# Patient Record
Sex: FEMALE | Race: BLACK OR AFRICAN AMERICAN | Employment: FULL TIME | URBAN - METROPOLITAN AREA
[De-identification: names, ages, dates, MRNs, and addresses within clinical notes are randomized per-mention and may not be internally consistent; named-entity substitution may affect disease eponyms.]

---

## 2020-07-15 PROBLEM — O16.9 ELEVATED BLOOD PRESSURE AFFECTING PREGNANCY, ANTEPARTUM: Status: ACTIVE | Noted: 2020-07-15

## 2020-07-15 PROBLEM — O09.512 PRIMIGRAVIDA OF ADVANCED MATERNAL AGE IN SECOND TRIMESTER: Status: ACTIVE | Noted: 2020-07-15

## 2020-08-11 PROBLEM — A74.9 CHLAMYDIA INFECTION COMPLICATING PREGNANCY, SECOND TRIMESTER: Status: ACTIVE | Noted: 2020-08-11

## 2020-08-11 PROBLEM — O98.812 CHLAMYDIA INFECTION COMPLICATING PREGNANCY, SECOND TRIMESTER: Status: ACTIVE | Noted: 2020-08-11

## 2020-08-13 PROBLEM — O99.012 ANEMIA AFFECTING PREGNANCY IN SECOND TRIMESTER: Status: ACTIVE | Noted: 2020-08-13

## 2020-09-25 PROBLEM — O13.2 GESTATIONAL HYPERTENSION, SECOND TRIMESTER: Status: ACTIVE | Noted: 2020-07-15

## 2020-09-28 PROBLEM — O10.012 CHRONIC BENIGN ESSENTIAL HYPERTENSION IN SECOND TRIMESTER: Status: ACTIVE | Noted: 2020-07-15

## 2020-09-28 PROBLEM — O99.212 OBESITY AFFECTING PREGNANCY IN SECOND TRIMESTER: Status: ACTIVE | Noted: 2020-09-28

## 2020-10-01 PROBLEM — O99.810 ABNORMAL GLUCOSE AFFECTING PREGNANCY: Status: ACTIVE | Noted: 2020-10-01

## 2020-10-07 PROBLEM — O99.013 ANEMIA AFFECTING PREGNANCY IN THIRD TRIMESTER: Status: ACTIVE | Noted: 2020-08-13

## 2020-10-07 PROBLEM — O99.213 OBESITY AFFECTING PREGNANCY IN THIRD TRIMESTER: Status: ACTIVE | Noted: 2020-09-28

## 2020-10-07 PROBLEM — O09.513 PRIMIGRAVIDA OF ADVANCED MATERNAL AGE IN THIRD TRIMESTER: Status: ACTIVE | Noted: 2020-07-15

## 2020-10-15 PROBLEM — O24.410 DIET CONTROLLED GESTATIONAL DIABETES MELLITUS (GDM) IN THIRD TRIMESTER: Status: ACTIVE | Noted: 2020-10-01

## 2020-10-21 ENCOUNTER — HOSPITAL ENCOUNTER (OUTPATIENT)
Dept: DIABETES SERVICES | Age: 40
Discharge: HOME | End: 2020-10-21
Payer: COMMERCIAL

## 2020-10-21 PROCEDURE — G0109 DIAB MANAGE TRN IND/GROUP: HCPCS

## 2020-10-21 NOTE — PROGRESS NOTES
This is a class  appointment with limited persons allowed in class due to CEQLL-01 public health emergency. Social distancing and mandatory precautions are in place and utilized. Gestational Diabetes Self-Management Support Plan    Services Provided: Pt received education on nutrition and meal planning during pregnancy. Emotional support for adjustment to diagnosis was provided. Care Plan/Recommendations:  Pt instructed to record blood sugar 4x/day and record all meals and snacks. Pt to bring information to appointments with [address] [place] [address] [address] [facility]. Notes for Follow Up:   1. Barriers to checking blood glucose and adherence to meal plan identified: Pt stated she did not know to check a fasting blood sugar. 2. Barriers to psychological adjustment to diagnosis identified: none  3. Patient needs to be seen by [facility] ASAP due to: no appointment set but referral is in her chart.       Jhonathan Weller, [address], LD, CDE  [facility] [address] [address]

## 2020-10-28 PROBLEM — O24.415 GESTATIONAL DIABETES MELLITUS (GDM) IN THIRD TRIMESTER CONTROLLED ON ORAL HYPOGLYCEMIC DRUG: Status: ACTIVE | Noted: 2020-10-01

## 2020-10-28 PROBLEM — O10.913 PREEXISTING HYPERTENSION COMPLICATING PREGNANCY, ANTEPARTUM, THIRD TRIMESTER: Status: ACTIVE | Noted: 2020-07-15

## 2020-11-24 PROBLEM — O36.5930 SMALL FOR GESTATIONAL AGE FETUS AFFECTING MANAGEMENT OF MOTHER IN SINGLETON PREGNANCY IN THIRD TRIMESTER: Status: ACTIVE | Noted: 2020-11-24

## 2020-12-10 ENCOUNTER — HOSPITAL ENCOUNTER (INPATIENT)
Age: 40
LOS: 3 days | Discharge: HOME | End: 2020-12-13
Attending: OBSTETRICS & GYNECOLOGY | Admitting: OBSTETRICS & GYNECOLOGY
Payer: COMMERCIAL

## 2020-12-10 DIAGNOSIS — G89.18 POSTOPERATIVE PAIN: Primary | ICD-10-CM

## 2020-12-10 PROBLEM — O13.9 GESTATIONAL HYPERTENSION: Status: ACTIVE | Noted: 2020-12-10

## 2020-12-10 PROBLEM — Z3A.37 37 WEEKS GESTATION OF PREGNANCY: Status: ACTIVE | Noted: 2020-12-10

## 2020-12-10 PROBLEM — O16.3 HYPERTENSION AFFECTING PREGNANCY IN THIRD TRIMESTER: Status: ACTIVE | Noted: 2020-07-15

## 2020-12-10 PROBLEM — Z34.90 ENCOUNTER FOR INDUCTION OF LABOR: Status: ACTIVE | Noted: 2020-12-10

## 2020-12-10 PROBLEM — O24.419 GDM (GESTATIONAL DIABETES MELLITUS): Status: ACTIVE | Noted: 2020-12-10

## 2020-12-10 LAB
ABO + RH BLD: NORMAL
BLOOD GROUP ANTIBODIES SERPL: NORMAL
ERYTHROCYTE [DISTWIDTH] IN BLOOD BY AUTOMATED COUNT: 19.9 % (ref 11.9–14.6)
GLUCOSE BLD STRIP.AUTO-MCNC: 99 MG/DL (ref 65–100)
HCT VFR BLD AUTO: 37.3 % (ref 35.8–46.3)
HGB BLD-MCNC: 11.4 G/DL (ref 11.7–15.4)
MCH RBC QN AUTO: 24.2 PG (ref 26.1–32.9)
MCHC RBC AUTO-ENTMCNC: 30.6 G/DL (ref 31.4–35)
MCV RBC AUTO: 79 FL (ref 79.6–97.8)
NRBC # BLD: 0 K/UL (ref 0–0.2)
PLATELET # BLD AUTO: 173 K/UL (ref 150–450)
PMV BLD AUTO: 11.6 FL (ref 9.4–12.3)
RBC # BLD AUTO: 4.72 M/UL (ref 4.05–5.2)
SPECIMEN EXP DATE BLD: NORMAL
WBC # BLD AUTO: 6.6 K/UL (ref 4.3–11.1)

## 2020-12-10 PROCEDURE — 65270000029 HC RM PRIVATE

## 2020-12-10 PROCEDURE — 82962 GLUCOSE BLOOD TEST: CPT

## 2020-12-10 PROCEDURE — 3E0P7VZ INTRODUCTION OF HORMONE INTO FEMALE REPRODUCTIVE, VIA NATURAL OR ARTIFICIAL OPENING: ICD-10-PCS | Performed by: OBSTETRICS & GYNECOLOGY

## 2020-12-10 PROCEDURE — 85027 COMPLETE CBC AUTOMATED: CPT

## 2020-12-10 PROCEDURE — 00HU33Z INSERTION OF INFUSION DEVICE INTO SPINAL CANAL, PERCUTANEOUS APPROACH: ICD-10-PCS | Performed by: ANESTHESIOLOGY

## 2020-12-10 PROCEDURE — 74011250637 HC RX REV CODE- 250/637: Performed by: OBSTETRICS & GYNECOLOGY

## 2020-12-10 PROCEDURE — 86900 BLOOD TYPING SEROLOGIC ABO: CPT

## 2020-12-10 RX ORDER — LIDOCAINE HYDROCHLORIDE 10 MG/ML
1 INJECTION INFILTRATION; PERINEURAL
Status: ACTIVE | OUTPATIENT
Start: 2020-12-10 | End: 2020-12-11

## 2020-12-10 RX ORDER — LIDOCAINE HYDROCHLORIDE 20 MG/ML
JELLY TOPICAL
Status: ACTIVE | OUTPATIENT
Start: 2020-12-10 | End: 2020-12-11

## 2020-12-10 RX ORDER — ACETAMINOPHEN 500 MG
1000 TABLET ORAL
Status: DISCONTINUED | OUTPATIENT
Start: 2020-12-10 | End: 2020-12-12

## 2020-12-10 RX ORDER — ONDANSETRON 2 MG/ML
4 INJECTION INTRAMUSCULAR; INTRAVENOUS
Status: DISCONTINUED | OUTPATIENT
Start: 2020-12-10 | End: 2020-12-12

## 2020-12-10 RX ORDER — OXYTOCIN/RINGER'S LACTATE 30/500 ML
87.3 PLASTIC BAG, INJECTION (ML) INTRAVENOUS AS NEEDED
Status: COMPLETED | OUTPATIENT
Start: 2020-12-10 | End: 2020-12-11

## 2020-12-10 RX ORDER — SODIUM CHLORIDE 0.9 % (FLUSH) 0.9 %
5-40 SYRINGE (ML) INJECTION EVERY 8 HOURS
Status: DISCONTINUED | OUTPATIENT
Start: 2020-12-10 | End: 2020-12-12

## 2020-12-10 RX ORDER — OXYTOCIN/RINGER'S LACTATE 30/500 ML
10 PLASTIC BAG, INJECTION (ML) INTRAVENOUS AS NEEDED
Status: COMPLETED | OUTPATIENT
Start: 2020-12-10 | End: 2020-12-11

## 2020-12-10 RX ORDER — SODIUM CHLORIDE 0.9 % (FLUSH) 0.9 %
5-40 SYRINGE (ML) INJECTION AS NEEDED
Status: DISCONTINUED | OUTPATIENT
Start: 2020-12-10 | End: 2020-12-12

## 2020-12-10 RX ORDER — MINERAL OIL
120 OIL (ML) ORAL
Status: DISPENSED | OUTPATIENT
Start: 2020-12-10 | End: 2020-12-11

## 2020-12-10 RX ORDER — DEXTROSE, SODIUM CHLORIDE, SODIUM LACTATE, POTASSIUM CHLORIDE, AND CALCIUM CHLORIDE 5; .6; .31; .03; .02 G/100ML; G/100ML; G/100ML; G/100ML; G/100ML
125 INJECTION, SOLUTION INTRAVENOUS CONTINUOUS
Status: DISCONTINUED | OUTPATIENT
Start: 2020-12-10 | End: 2020-12-12

## 2020-12-10 RX ORDER — BUTORPHANOL TARTRATE 2 MG/ML
1 INJECTION INTRAMUSCULAR; INTRAVENOUS
Status: DISCONTINUED | OUTPATIENT
Start: 2020-12-10 | End: 2020-12-12

## 2020-12-10 RX ORDER — ZOLPIDEM TARTRATE 5 MG/1
5 TABLET ORAL
Status: DISCONTINUED | OUTPATIENT
Start: 2020-12-10 | End: 2020-12-12

## 2020-12-10 RX ADMIN — MISOPROSTOL 50 MCG: 100 TABLET ORAL at 21:32

## 2020-12-11 ENCOUNTER — ANESTHESIA EVENT (OUTPATIENT)
Dept: LABOR AND DELIVERY | Age: 40
End: 2020-12-11
Payer: COMMERCIAL

## 2020-12-11 ENCOUNTER — ANESTHESIA (OUTPATIENT)
Dept: LABOR AND DELIVERY | Age: 40
End: 2020-12-11
Payer: COMMERCIAL

## 2020-12-11 LAB
ARTERIAL PATENCY WRIST A: ABNORMAL
ARTERIAL PATENCY WRIST A: ABNORMAL
BASE DEFICIT BLD-SCNC: 8 MMOL/L
BASE DEFICIT BLD-SCNC: 8 MMOL/L
BDY SITE: ABNORMAL
BDY SITE: ABNORMAL
CO2 BLD-SCNC: 21 MMOL/L
CO2 BLD-SCNC: 22 MMOL/L
COLLECT TIME,HTIME: 2215
COLLECT TIME,HTIME: 2219
GAS FLOW.O2 O2 DELIVERY SYS: ABNORMAL L/MIN
GAS FLOW.O2 O2 DELIVERY SYS: ABNORMAL L/MIN
GLUCOSE BLD STRIP.AUTO-MCNC: 79 MG/DL (ref 65–100)
GLUCOSE BLD STRIP.AUTO-MCNC: 79 MG/DL (ref 65–100)
GLUCOSE BLD STRIP.AUTO-MCNC: 82 MG/DL (ref 65–100)
HCO3 BLD-SCNC: 20.8 MMOL/L (ref 22–26)
HCO3 BLDV-SCNC: 19.4 MMOL/L (ref 23–28)
PCO2 BLDCO: 47 MMHG (ref 32–68)
PCO2 BLDCO: 53 MMHG (ref 32–68)
PH BLDCO: 7.2 [PH] (ref 7.15–7.38)
PH BLDCO: 7.23 [PH] (ref 7.15–7.38)
PO2 BLDCO: 18 MMHG
PO2 BLDCO: 18 MMHG
SAO2 % BLD: 19 % (ref 95–98)
SAO2 % BLDV: 19 % (ref 65–88)
SERVICE CMNT-IMP: ABNORMAL
SERVICE CMNT-IMP: ABNORMAL
SPECIMEN TYPE: ABNORMAL
SPECIMEN TYPE: ABNORMAL

## 2020-12-11 PROCEDURE — 74011000250 HC RX REV CODE- 250: Performed by: NURSE ANESTHETIST, CERTIFIED REGISTERED

## 2020-12-11 PROCEDURE — 74011250636 HC RX REV CODE- 250/636: Performed by: OBSTETRICS & GYNECOLOGY

## 2020-12-11 PROCEDURE — 82803 BLOOD GASES ANY COMBINATION: CPT

## 2020-12-11 PROCEDURE — 0KQM0ZZ REPAIR PERINEUM MUSCLE, OPEN APPROACH: ICD-10-PCS | Performed by: OBSTETRICS & GYNECOLOGY

## 2020-12-11 PROCEDURE — 74011250637 HC RX REV CODE- 250/637: Performed by: OBSTETRICS & GYNECOLOGY

## 2020-12-11 PROCEDURE — 74011000250 HC RX REV CODE- 250: Performed by: ANESTHESIOLOGY

## 2020-12-11 PROCEDURE — 77030014125 HC TY EPDRL BBMI -B: Performed by: ANESTHESIOLOGY

## 2020-12-11 PROCEDURE — 82962 GLUCOSE BLOOD TEST: CPT

## 2020-12-11 PROCEDURE — 10907ZC DRAINAGE OF AMNIOTIC FLUID, THERAPEUTIC FROM PRODUCTS OF CONCEPTION, VIA NATURAL OR ARTIFICIAL OPENING: ICD-10-PCS | Performed by: OBSTETRICS & GYNECOLOGY

## 2020-12-11 PROCEDURE — A4300 CATH IMPL VASC ACCESS PORTAL: HCPCS | Performed by: ANESTHESIOLOGY

## 2020-12-11 PROCEDURE — 74011250636 HC RX REV CODE- 250/636: Performed by: NURSE ANESTHETIST, CERTIFIED REGISTERED

## 2020-12-11 PROCEDURE — 65270000029 HC RM PRIVATE

## 2020-12-11 RX ORDER — FENTANYL CITRATE 50 UG/ML
INJECTION, SOLUTION INTRAMUSCULAR; INTRAVENOUS AS NEEDED
Status: DISCONTINUED | OUTPATIENT
Start: 2020-12-11 | End: 2020-12-11 | Stop reason: HOSPADM

## 2020-12-11 RX ORDER — ROPIVACAINE HYDROCHLORIDE 2 MG/ML
INJECTION, SOLUTION EPIDURAL; INFILTRATION; PERINEURAL
Status: DISCONTINUED | OUTPATIENT
Start: 2020-12-11 | End: 2020-12-11 | Stop reason: HOSPADM

## 2020-12-11 RX ORDER — OXYTOCIN/RINGER'S LACTATE 30/500 ML
1-25 PLASTIC BAG, INJECTION (ML) INTRAVENOUS
Status: DISCONTINUED | OUTPATIENT
Start: 2020-12-11 | End: 2020-12-12

## 2020-12-11 RX ORDER — EPHEDRINE SULFATE/0.9% NACL/PF 50 MG/5 ML
SYRINGE (ML) INTRAVENOUS AS NEEDED
Status: DISCONTINUED | OUTPATIENT
Start: 2020-12-11 | End: 2020-12-11 | Stop reason: HOSPADM

## 2020-12-11 RX ORDER — LIDOCAINE HYDROCHLORIDE AND EPINEPHRINE 15; 5 MG/ML; UG/ML
INJECTION, SOLUTION EPIDURAL
Status: COMPLETED | OUTPATIENT
Start: 2020-12-11 | End: 2020-12-11

## 2020-12-11 RX ADMIN — LIDOCAINE HYDROCHLORIDE,EPINEPHRINE BITARTRATE 4 ML: 15; .005 INJECTION, SOLUTION EPIDURAL; INFILTRATION; INTRACAUDAL; PERINEURAL at 12:06

## 2020-12-11 RX ADMIN — SODIUM CHLORIDE, SODIUM LACTATE, POTASSIUM CHLORIDE, AND CALCIUM CHLORIDE 500 ML: 600; 310; 30; 20 INJECTION, SOLUTION INTRAVENOUS at 11:06

## 2020-12-11 RX ADMIN — MISOPROSTOL 50 MCG: 100 TABLET ORAL at 01:45

## 2020-12-11 RX ADMIN — ROPIVACAINE HYDROCHLORIDE 9 ML/HR: 2 INJECTION, SOLUTION EPIDURAL; INFILTRATION at 12:18

## 2020-12-11 RX ADMIN — ROPIVACAINE HYDROCHLORIDE: 2 INJECTION, SOLUTION EPIDURAL; INFILTRATION at 21:16

## 2020-12-11 RX ADMIN — Medication 10000 MILLI-UNITS: at 22:41

## 2020-12-11 RX ADMIN — FENTANYL CITRATE 100 MCG: 50 INJECTION INTRAMUSCULAR; INTRAVENOUS at 21:16

## 2020-12-11 RX ADMIN — SODIUM CHLORIDE, SODIUM LACTATE, POTASSIUM CHLORIDE, CALCIUM CHLORIDE, AND DEXTROSE MONOHYDRATE 125 ML/HR: 600; 310; 30; 20; 5 INJECTION, SOLUTION INTRAVENOUS at 11:06

## 2020-12-11 RX ADMIN — Medication 10 MG: at 12:33

## 2020-12-11 RX ADMIN — Medication 87.3 MILLI-UNITS/MIN: at 23:00

## 2020-12-11 RX ADMIN — Medication 2 MILLI-UNITS/MIN: at 11:45

## 2020-12-11 RX ADMIN — MISOPROSTOL 50 MCG: 100 TABLET ORAL at 06:17

## 2020-12-11 NOTE — ANESTHESIA PROCEDURE NOTES
Epidural Block    Start time: 12/11/2020 11:55 AM  End time: 12/11/2020 12:10 PM  Performed by: Nayeli Jernigan MD  Authorized by: Nayeli Jernigan MD     Pre-Procedure  Timeout Time: 11:56        Epidural:   Patient position:  Seated  Prep: Chlorhexidine    Location:  L3-4    Needle and Epidural Catheter:   Needle Type:  Tuohy  Needle Gauge:  17 G  Injection Technique:  Loss of resistance using air  Attempts:  1  Catheter Size:  19 G  Catheter at Skin Depth (cm):  12  Depth in Epidural Space (cm):  5  Events: no blood with aspiration, no cerebrospinal fluid with aspiration, no paresthesia and negative aspiration test    Test Dose:  Negative    Assessment:   Catheter Secured:  Tegaderm and tape  Insertion:  Uncomplicated  Patient tolerance:  Patient tolerated the procedure well with no immediate complications

## 2020-12-11 NOTE — PROGRESS NOTES
Pt sleeping. Offers no c/o  Denies HA, epigastriic pain and or visual changes. Pt feels a little back pressure occasionally.   Will recheck at 10AM

## 2020-12-11 NOTE — ANESTHESIA PREPROCEDURE EVALUATION
Relevant Problems   No relevant active problems       Anesthetic History   No history of anesthetic complications            Review of Systems / Medical History  Patient summary reviewed and pertinent labs reviewed    Pulmonary  Within defined limits                 Neuro/Psych   Within defined limits           Cardiovascular    Hypertension (PIH, normal LFTs amd platelet count)              Exercise tolerance: >4 METS     GI/Hepatic/Renal                Endo/Other    Diabetes (Gestational DM, on metformin)    Obesity     Other Findings              Physical Exam    Airway  Mallampati: II  TM Distance: 4 - 6 cm  Neck ROM: normal range of motion   Mouth opening: Normal     Cardiovascular    Rhythm: regular           Dental  No notable dental hx       Pulmonary  Breath sounds clear to auscultation               Abdominal  GI exam deferred       Other Findings            Anesthetic Plan    ASA: 3  Anesthesia type: epidural            Anesthetic plan and risks discussed with: Patient      Discussed anesthesia options and risks with patient who wishes to proceed with NICOLA

## 2020-12-11 NOTE — PROGRESS NOTES
Dr. Maria Victoria Abbasi telephoned for orders. POC glucose now. Ambien, pepcid, zofran, tylenol PRN. See MAR for details. MD informed pt was just recently put on EFM and admission database completed due to patient/staff ratio. Will administer first dose of cytotec after reassuring NST obtained.

## 2020-12-11 NOTE — PROGRESS NOTES
MAYAE dimple/0/-3, will ask MD if wants another dose of cytotec or to start pitocin. MD in OR at the moment.

## 2020-12-11 NOTE — PROGRESS NOTES
RN to bedside. Admission database complete. IV started and blood drawn. Sent to lab. POC reviewed. Pt denies questions. Admission assessment complete.

## 2020-12-11 NOTE — PROGRESS NOTES
80- MD to the room, SVE 8/100/-1, EFM strip reviewed. No new orders. Pt placed on left side with peanut.     1541- BS 82

## 2020-12-11 NOTE — PROGRESS NOTES
Pt sleeping. No c/o HA, visual changes per nurse. 130-140/moderate variability/+accels/no decels  Ctx's q5-6min  cx high per nurse. Difficult to ck. Has had 2 doses cytotec.

## 2020-12-11 NOTE — PROGRESS NOTES
cx 4/75/-2  AROM with clear fluid  Pitocin now  Epidural prn  FHR reactive    Stoney MD Kaycee  10:59 AM

## 2020-12-12 PROCEDURE — 77030019905 HC CATH URETH INTMIT MDII -A

## 2020-12-12 PROCEDURE — 51798 US URINE CAPACITY MEASURE: CPT

## 2020-12-12 PROCEDURE — 77030003028 HC SUT VCRL J&J -A

## 2020-12-12 PROCEDURE — 76060000078 HC EPIDURAL ANESTHESIA

## 2020-12-12 PROCEDURE — 2709999900 HC NON-CHARGEABLE SUPPLY

## 2020-12-12 PROCEDURE — 75410000003 HC RECOV DEL/VAG/CSECN EA 0.5 HR

## 2020-12-12 PROCEDURE — 74011250637 HC RX REV CODE- 250/637: Performed by: OBSTETRICS & GYNECOLOGY

## 2020-12-12 PROCEDURE — 65270000029 HC RM PRIVATE

## 2020-12-12 PROCEDURE — 75410000000 HC DELIVERY VAGINAL/SINGLE

## 2020-12-12 PROCEDURE — 77030011945 HC CATH URIN INT ST MENT -A

## 2020-12-12 PROCEDURE — 75410000002 HC LABOR FEE PER 1 HR

## 2020-12-12 RX ORDER — SIMETHICONE 80 MG
80 TABLET,CHEWABLE ORAL
Status: DISCONTINUED | OUTPATIENT
Start: 2020-12-12 | End: 2020-12-13 | Stop reason: HOSPADM

## 2020-12-12 RX ORDER — DOCUSATE SODIUM 100 MG/1
100 CAPSULE, LIQUID FILLED ORAL 2 TIMES DAILY
Status: DISCONTINUED | OUTPATIENT
Start: 2020-12-12 | End: 2020-12-13 | Stop reason: HOSPADM

## 2020-12-12 RX ORDER — DIPHENHYDRAMINE HCL 25 MG
25-50 CAPSULE ORAL
Status: DISCONTINUED | OUTPATIENT
Start: 2020-12-12 | End: 2020-12-13 | Stop reason: HOSPADM

## 2020-12-12 RX ORDER — HYDROCODONE BITARTRATE AND ACETAMINOPHEN 5; 325 MG/1; MG/1
1-2 TABLET ORAL
Status: DISCONTINUED | OUTPATIENT
Start: 2020-12-12 | End: 2020-12-13 | Stop reason: HOSPADM

## 2020-12-12 RX ORDER — FUROSEMIDE 20 MG/1
20 TABLET ORAL DAILY
Status: DISCONTINUED | OUTPATIENT
Start: 2020-12-12 | End: 2020-12-12

## 2020-12-12 RX ORDER — NALOXONE HYDROCHLORIDE 0.4 MG/ML
0.4 INJECTION, SOLUTION INTRAMUSCULAR; INTRAVENOUS; SUBCUTANEOUS AS NEEDED
Status: DISCONTINUED | OUTPATIENT
Start: 2020-12-12 | End: 2020-12-13 | Stop reason: HOSPADM

## 2020-12-12 RX ORDER — IBUPROFEN 800 MG/1
800 TABLET ORAL
Status: DISCONTINUED | OUTPATIENT
Start: 2020-12-12 | End: 2020-12-13 | Stop reason: HOSPADM

## 2020-12-12 RX ORDER — ZOLPIDEM TARTRATE 5 MG/1
10 TABLET ORAL
Status: DISCONTINUED | OUTPATIENT
Start: 2020-12-12 | End: 2020-12-13 | Stop reason: HOSPADM

## 2020-12-12 RX ORDER — NIFEDIPINE 30 MG/1
30 TABLET, EXTENDED RELEASE ORAL DAILY
Status: DISCONTINUED | OUTPATIENT
Start: 2020-12-12 | End: 2020-12-12

## 2020-12-12 RX ADMIN — DOCUSATE SODIUM 100 MG: 100 CAPSULE, LIQUID FILLED ORAL at 17:23

## 2020-12-12 RX ADMIN — Medication 1 SPRAY: at 02:20

## 2020-12-12 RX ADMIN — IBUPROFEN 800 MG: 800 TABLET ORAL at 02:23

## 2020-12-12 RX ADMIN — IBUPROFEN 800 MG: 800 TABLET ORAL at 08:25

## 2020-12-12 RX ADMIN — DOCUSATE SODIUM 100 MG: 100 CAPSULE, LIQUID FILLED ORAL at 08:25

## 2020-12-12 NOTE — LACTATION NOTE
In to see mom and infant for the first time. Mom has been offering infant the breast and then pumping. Infant has been very sleepy and not interested in latching and sucking. Mom us expressing drops and feeding that to infant on her finger. Encouraged mom to continue her plan. Reviewed the expectations of the first 24 hours as well as the second night of life. Lactation consultant will follow up tomorrow.

## 2020-12-12 NOTE — LACTATION NOTE
This note was copied from a baby's chart. RN attempted to help infant latch on multiple times in multiple positions. Infant will latch on for a second and then come off. Mother does have flat nipples but infant can latch and achieve a good suck and then she loses interest and comes off. Blood sugar 54 at this time.

## 2020-12-12 NOTE — PROGRESS NOTES
Delivery Note    Dr Riccardo Cole arrived to bedside at 2200. Pt positioned for delivery and set up at 2200. Vacuum assisted vaginal delivery of viable female infant @ 2219. Apgar's 8/9. See delivery summary for details.

## 2020-12-12 NOTE — PROGRESS NOTES
SBAR OUT Report: Mother    Verbal report given to MATT Barclay RN (full name & credentials) on this patient, who is now being transferred to MIU (unit) for routine progression of care. The patient is not wearing a green \"Anesthesia-Duramorph\" band. Report consisted of patient's Situation, Background, Assessment and Recommendations (SBAR).  ID bands were compared with the identification form, and verified with the patient and receiving nurse. Information from the SBAR, Procedure Summary, Intake/Output and MAR and the Roro Report was reviewed with the receiving nurse; opportunity for questions and clarification provided.

## 2020-12-12 NOTE — ANESTHESIA POSTPROCEDURE EVALUATION
epidural    Anesthesia Post Evaluation      Multimodal analgesia: multimodal analgesia used between 6 hours prior to anesthesia start to PACU discharge  Patient location during evaluation: PACU  Patient participation: complete - patient participated  Level of consciousness: awake and alert  Pain management: adequate  Airway patency: patent  Anesthetic complications: no  Cardiovascular status: acceptable  Respiratory status: acceptable  Hydration status: acceptable  Comments: The patient was satisfied with her labor epidural and denies any complications. Her lower extremities have returned to baseline neurologically.   Post anesthesia nausea and vomiting:  none  Final Post Anesthesia Temperature Assessment:  Normothermia (36.0-37.5 degrees C)

## 2020-12-12 NOTE — PROGRESS NOTES
Dr Thiago Webb at desk. MD notified of post void residual 244-256 mls. MD states to allow pt to attempt to void in shower, if fundus becomes displaced may scan bladder, if pt needs to have I&O cath MD states would place Sunshine instead. MD states to DC Lasix and Procardia. Read back.

## 2020-12-12 NOTE — PROGRESS NOTES
Call received from md, pt pushing status given, md states she will come to the room to evaluate using a vacuum.

## 2020-12-12 NOTE — PROGRESS NOTES
SBAR IN Report: Mother    Verbal report received from Maya Graf RN  on this patient, who is now being transferred from L&D  for routine progression of care. The patient is wearing a green \"Anesthesia-Duramorph\" band. Report consisted of patient's Situation, Background, Assessment and Recommendations (SBAR). [place]iden ID bands were compared with the identification form, and verified with the patient and transferring nurse. Information from the SBAR and the Roro Report was reviewed with the transferring nurse; opportunity for questions and clarification provided.

## 2020-12-12 NOTE — L&D DELIVERY NOTE
Delivery Summary    Patient: Edward Brooke MRN: [id number]  SSN: [id number]    YOB: 1980  Age: 38 y.o. Sex: female       Information for the patient's :  Veronica Archer [[id number]]       Labor Events:    Labor: No    Steroids: None   Cervical Ripening Date/Time: 12/10/2020 9:32 PM   Cervical Ripening Type: Misoprostol   Antibiotics During Labor: No   Rupture Identifier:      Rupture Date/Time: 2020 10:59 AM   Rupture Type: AROM   Amniotic Fluid Volume: Moderate    Amniotic Fluid Description: Clear    Amniotic Fluid Odor:      Induction: None       Induction Date/Time:        Indications for Induction:      Augmentation: Oxytocin   Augmentation Date/Time: 202011:45 AM   Indications for Augmentation: Hypertension   Labor complications: None       Additional complications:        Delivery Events:  Indications For Episiotomy:     Episiotomy:     Perineal Laceration(s):     Repaired:     Periurethral Laceration Location:      Repaired:     Labial Laceration Location:     Repaired:     Sulcal Laceration Location:     Repaired:     Vaginal Laceration Location:     Repaired:     Cervical Laceration Location:     Repaired:     Repair Suture:     Number of Repair Packets:     Estimated Blood Loss (ml):  ml   Quantitative Blood Loss (ml)                Delivery Date: 2020    Delivery Time: 10:19 PM  Delivery Type: Vaginal, Spontaneous  Sex:  Female    Gestational Age: 41w10d   Delivery Clinician:  Mackenzie Wright  Living Status: Living   Delivery Location: L&D 436          APGARS  One minute Five minutes Ten minutes   Skin color: 1   1        Heart rate: 2   2        Grimace: 2   2        Muscle tone: 1   2        Breathin   2        Totals: 8   9            Presentation: Vertex    Position:   Occiput Posterior  Resuscitation Method:  Suctioning-bulb; Tactile Stimulation     Meconium Stained: None      Cord Information: 3 Vessels  Complications: None  Cord around:    Delayed cord clamping? Yes  Cord clamped date/time:2020 10:20 PM  Disposition of Cord Blood: Lab    Blood Gases Sent?: Yes    Placenta:  Date/Time: 2020 10:41 PM  Removal: Spontaneous      Appearance: Normal;Intact     Clarinda Measurements:  Birth Weight: 6 lb 1.2 oz (2.755 kg)      Birth Length: 52.1 cm      Head Circumference: 33 cm      Chest Circumference: 30.5 cm     Abdominal Girth: Other Providers:   Bertha MENSAH;RYAN SANTOS;REYNA KWOK;SAVANNAH ADRIAN;ANGELA VELAZQUEZ, Obstetrician;Primary Nurse;Primary Clarinda Nurse;Scrub Tech;Neonatologist           Group B Strep: No results found for: GRBSEXT, GRBSEXT  Information for the patient's :  Maxine Yao [[id number]]     Lab Results   Component Value Date/Time    ABO/Rh(D) O POSITIVE 2020 10:19 PM    JO IgG NEG 2020 10:19 PM      Recent Labs     20  2233 20  2232   PCO2CB 52 53   PO2CB 18 18   HCO3I  --  20.8*   SO2I  --  19*   IBD 8 8   SPECTI VENOUS CORD ARTERIAL CORD   PHICB 7.23 7.20   ISITE CORD CORD   IDEV OTHER OTHER   IALLEN NOT APPLICABLE NOT APPLICABLE      Delivery Note      No results found for: APH, APCO2, APO2, AHCO3, ABEC, ABDC, O2ST, SITE, RSCOM         No results found for: RUBELLAEXT, GRBSEXT         Procedure:  Patient became completely dilated and pushed. She pushed for almost 2 hours. The head would get to 2+ but then retract. She could not maintain it at 2+. The head felt to be OP. She was fatigued. Consent for vacuum was given. Vacuum applied. Pressure at 100 mm Hg when no contraction and 550mm Hg with contraction/pull. Pulled for 5 contractions with one popoff. On 22:07 off at delivery. On the last pull the fetal head self rotated to an oa position. Shoulders delivered without any evidence of dystocia. Baby delivered in the bed, was dried. The cord was clamped and cut. Cord pH and cord blood was obtained.   The baby was taken to isolette for MD.   The perineum was examined. 2nd degree tear repaired with 2.0 and 3.0 vicryl  The placenta was delivered spontaneously. It was examined. It was intact with three vessels. Mom and baby are doing well.          Tyron Rainey MD  12/11/2020  11:07 PM

## 2020-12-12 NOTE — PROGRESS NOTES
Dr Sherman Vinson notified of fundus firm 2 above umbilicus and shifted to right of midline. Pt has voided multiple times but only 100 ml each time, bladder scan reading 567 mls. Telephone order received for I&O cath and scan bladder for post void after next voiding attempt. MD notified of /69 and 119/76, MD states to hold Labetalol and Procardia at this time. Read back.

## 2020-12-12 NOTE — PROGRESS NOTES
Addendum:    All BPs since 0700 have been nl. Procardia and Lasix held. Bilateral LEs  No pitting edema. Pt Denies:  HA, visual changes or RUQ/epigastric pain    Per pt she did not have HTN prior to preg. Pt reports her BPs have been labile the entire preg, sometimes elevated but then normalize so she has never been on BP meds. Continue to hold off on Lasix and Procardia, see how her BPs trend overnight and tomorrow. PIH sx reviewed. Feels like she is emptying her bladder better. Uterus currently at the umbilicus, per RN is considerable lower than it was this am. Parameters for further bladder scans d/w RN. Pt encouraged to shower and see if she can empty her bladder more completely in the shower.      Patient Vitals for the past 24 hrs:   Temp Pulse Resp BP SpO2   12/12/20 1530 99 °F (37.2 °C) (!) 109 19 133/78 96 %   12/12/20 1104 97.7 °F (36.5 °C) 100 19 117/73 98 %   12/12/20 0920  (!) 104  119/76    12/12/20 0711 98.4 °F (36.9 °C) (!) 109 20 116/69 96 %   12/12/20 0559  99 18 (!) 139/94 99 %   12/12/20 0250  100 18 (!) 149/87 99 %   12/12/20 0150 99.2 °F (37.3 °C) 100 18 (!) 139/98 99 %   12/12/20 0036  99  (!) 152/89    12/12/20 0006  97  (!) 145/89    12/11/20 2321  100  129/72    12/11/20 2251  (!) 110  (!) 143/88    12/11/20 2236  (!) 104  (!) 141/80    12/11/20 2221  96  136/74    12/11/20 2151  (!) 101  134/75    12/11/20 2136  (!) 106  137/76    12/11/20 2121  (!) 114  (!) 141/80    12/11/20 2106  (!) 115  (!) 144/82    12/11/20 2051  (!) 111  (!) 148/87    12/11/20 2036  (!) 117  136/79    12/11/20 2021  (!) 117  132/75    12/11/20 2006  (!) 111  (!) 140/75    12/11/20 1953  (!) 125  136/79    12/11/20 1936  100  (!) 158/86    12/11/20 1922  (!) 104  (!) 161/94    12/11/20 1906  (!) 105  (!) 142/88 

## 2020-12-12 NOTE — PROGRESS NOTES
Post-Partum Day Number 1 Progress Note    Patient doing well post-partum day #1 without significant complaint. Voiding without difficulty, normal lochia.     Patient Vitals for the past 24 hrs:   Temp Pulse Resp BP SpO2   12/12/20 0711 98.4 °F (36.9 °C) (!) 109 20 116/69 96 %   12/12/20 0559  99 18 (!) 139/94 99 %   12/12/20 0250  100 18 (!) 149/87 99 %   12/12/20 0150 99.2 °F (37.3 °C) 100 18 (!) 139/98 99 %   12/12/20 0036  99  (!) 152/89    12/12/20 0006  97  (!) 145/89    12/11/20 2321  100  129/72    12/11/20 2251  (!) 110  (!) 143/88    12/11/20 2236  (!) 104  (!) 141/80    12/11/20 2221  96  136/74    12/11/20 2151  (!) 101  134/75    12/11/20 2136  (!) 106  137/76    12/11/20 2121  (!) 114  (!) 141/80    12/11/20 2106  (!) 115  (!) 144/82    12/11/20 2051  (!) 111  (!) 148/87    12/11/20 2036  (!) 117  136/79    12/11/20 2021  (!) 117  132/75    12/11/20 2006  (!) 111  (!) 140/75    12/11/20 1953  (!) 125  136/79    12/11/20 1936  100  (!) 158/86    12/11/20 1922  (!) 104  (!) 161/94    12/11/20 1906  (!) 105  (!) 142/88    12/11/20 1837  (!) 113  (!) 142/77    12/11/20 1807  (!) 123  (!) 151/84    12/11/20 1751  (!) 111  (!) 157/81    12/11/20 1736  100  132/79    12/11/20 1721  99  133/75    12/11/20 1706  89  (!) 142/71    12/11/20 1653  86  (!) 166/85    12/11/20 1636  86  133/82    12/11/20 1621  82  139/76    12/11/20 1606  85  125/70    12/11/20 1552  81  125/67    12/11/20 1536  97  124/72    12/11/20 1521  86  127/74    12/11/20 1506  89  125/70    12/11/20 1451 98.3 °F (36.8 °C) (!) 101  (!) 130/93    12/11/20 1436  (!) 113  102/66    12/11/20 1421  (!) 121  (!) 92/56    12/11/20 1406  82  118/66    12/11/20 1352  92  116/64    12/11/20 1336  (!) 114  (!) 94/55    12/11/20 1321  (!) 108  129/69    12/11/20 1306  (!) 102  111/61    12/11/20 1250  98  (!) 107/58    12/11/20 1245  (!) 110  (!) 102/54    12/11/20 1240  81  126/64    12/11/20 1236  (!) 108  123/73    12/11/20 1232  99  (!) 97/55    12/11/20 1230  (!) 107  (!) 97/52    12/11/20 1228  100 16 (!) 107/58    12/11/20 1225  (!) 112  (!) 78/42    12/11/20 1222  (!) 103  (!) 104/56    12/11/20 1220  (!) 112  (!) 94/50    12/11/20 1219  (!) 107  (!) 104/58    12/11/20 1218  (!) 103  (!) 111/58    12/11/20 1217  (!) 110  (!) 105/59    12/11/20 1216  (!) 115  (!) 109/57    12/11/20 1215    (!) 116/59    12/11/20 1214  (!) 109  124/65    12/11/20 1213  (!) 114  130/73    12/11/20 1212  (!) 118  128/73    12/11/20 1211  (!) 112  135/76    12/11/20 1210  (!) 103  (!) 141/81    12/11/20 1209  100  (!) 147/87    12/11/20 1208  100  (!) 140/84    12/11/20 1207  (!) 101  (!) 147/90    12/11/20 1206  (!) 104  (!) 137/92    12/11/20 1205 98.2 °F (36.8 °C) (!) 112  (!) 136/93    12/11/20 1013  99  133/82    12/11/20 0914 98.7 °F (37.1 °C) (!) 106 20 (!) 143/76        Exam:  Patient without distress. Abdomen soft, fundus firm at level of umbilicus, nontender               Perineum with normal lochia noted. Lower extremities are negative for swelling, cords or tenderness.         Recent Labs     12/10/20  2102 11/27/20  0948 09/30/20  0950 09/23/20  1636 08/12/20  1040 07/23/20  0952   WBC 6.6 5.5 7.5 8.7 8.1 8.2   HGB 11.4* 10.6* 10.7* 10.4* 10.9* 11.4   HCT 37.3 35.0 33.5* 33.3* 34.7 35.7    163 166 185 172 176       Recent Labs     10/14/20  1119 09/23/20  1636 08/12/20  1040   NA  --  135 134   K  --  3.9 4.4   CL  --  104 104   CO2  --  20 18*   * 102* 104*   BUN  --  9 11   CREA  --  0.65 0.68   GFRAA  --  128 127   GFRNA  --  111 110   CA  --  8.9 9.0   ALB  --  3.8 3.8   TP  --  6.7 6.8   AGRAT  --  1.3 1.3   AST  --  18 21   ALT  --  22 30       Recent Labs     09/23/20  1636 08/12/20  1040   URICA 3.5 4.0    154        Recent Labs     12/11/20  1539 12/11/20  1247 12/11/20  0730 12/10/20  2138 10/14/20  1119 09/23/20  1636 08/12/20  1040   GLU  --   --   --   --  101* 102* 104*   GLUCPOC 82 79 79 99  --   --   --        Problem List  Date Reviewed: 12/10/2020          Codes Class Noted    GDM (gestational diabetes mellitus) ICD-10-CM: O24.419  ICD-9-CM: 648.80  12/10/2020        * (Principal) Gestational hypertension ICD-10-CM: O13.9  ICD-9-CM: 642.30  12/10/2020        37 weeks gestation of pregnancy ICD-10-CM: Z3A.37  ICD-9-CM: V22.2  12/10/2020        Encounter for induction of labor ICD-10-CM: Z34.90  ICD-9-CM: V22.1  12/10/2020        Small for gestational age fetus affecting management of mother in malik pregnancy in third trimester ICD-10-CM: O36.5930  ICD-9-CM: 656.53  11/24/2020    Overview Signed 11/24/2020  2:40 PM by Sd Ken MD     11/24/2020:  EFW 28%, AC 12%, SAMAN 20.4 cm, vtx             Gestational diabetes mellitus (GDM) in third trimester controlled on oral hypoglycemic drug ICD-10-CM: O24.415  ICD-9-CM: 648.80  10/1/2020    Overview Addendum 11/27/2020  9:36 AM by Joslyn Smith NP     Failed 1 hr glucola, failed 3hr GTT   10/28/2020:  approx 50% log elevated - starting Metformin    11/12/2020: BS log reviewed, almost all fastings elevated. Increase Metformin to 500 mg in am and 1000 mg in pm    11/19/2020: Fasting improved 92-99, all 1hr PP within range.  Continue Metformin 500 mg in am and 1000mg in pm    11/27/2020: Fastings and 1hr PP all within target, will decrease Metformin to 500mg PO BID due to AC%             Obesity affecting pregnancy in third trimester ICD-10-CM: O99.213  ICD-9-CM: 649.13  9/28/2020    Overview Addendum 11/5/2020  1:23 PM by Sd Ken MD     7/23/20 Hgb A1C 5.5  9/28/2020 at [facility]: getting GTT 1 hr Wed at [facility]               Anemia affecting pregnancy in third trimester ICD-10-CM: O99.013  ICD-9-CM: 648.23, 285.9 2020    Overview Addendum 12/3/2020  2:23 PM by Abner Owens NP     Add'l Fe/Colace  2020 at [facility]:    · Recheck hgb 4 weeks  · Refer to hematology if hgb <9.5    2020: Hgb 10.7  2020: Hgb 10.6             Chlamydia infection complicating pregnancy, second trimester ICD-10-CM: O98.812, A74.9  ICD-9-CM: 647.63, 079.98  2020    Overview Addendum 12/3/2020  2:23 PM by Abner Owens NP     With NOB prenatal - treated    PLAN:  Recheck at 35-36 weeks with other STDs    2020: recheck neg for GC/CT/TV  2020: recheck negative             Primigravida of advanced maternal age in third trimester ICD-10-CM: O09.513  ICD-9-CM: 659.53  7/15/2020    Overview Addendum 2020  1:26 PM by Arsh Little MD     [facility] by 16  week US not C/W LMP+  PLAN:  NIPT (negx3,female), baby ASA 12-36 weeks, MFM referral,  testing around 32 weeks with delivery around 39 weeks    8/10/2020 at [facility]:  Normal anatomy and echo; AC 20%, CL 4.0cm. LOW RISK NIPT    2020 at [facility]: EFW 57%, AC 42%, SAMAN nl, dopplers nl, BREECH, no explanation for VB  2020 at [facility]: Normal repeat echo;  AC 37%, Overall 47%, SAMAN 19.5  · No F/U MFM; return for concerns    10/14/2020: Plans breastfeeding, TDAP/Flu vaccine recommended, LARCs reviewed  2020:  EFW 47%, AC 34%, SAMAN 16.6 cm, vtx             Hypertension affecting pregnancy in third trimester ICD-10-CM: O16.3  ICD-9-CM: 642.93  7/15/2020    Overview Addendum 12/10/2020  1:36 PM by Arsh Little MD     CHTN vs Gestational HTN    7/15/2020:  Single elevation, usually wnl per pt, anxious at visit today  -Close obs  8/10/2020 at [facility]: BP elevated then normal when rechecked. May have underlying CHTN, denies history.   2020: 24hr urine protein 110mg, all labs wnl  2020: /90 x2, will complete preeclampsia labs today, RTO Friday for BP check and to dropoff 24 hr urineDenies HA, vision changes, RUQ/epigastric pain, N/V or swelling. Preeclampsia labs nl, urine P/C ratio 196  2020: 24 urine protein 246mg  2020 at [facility]: 142/82 with reg cuff, 124/78 with large cuff. Denies symptoms  Repeat preeclamptic labs for elevated BP or symptoms in your office :  (CBC, CMP, LDH, Uric Acid); also do P/C ratio (if elevated, need to then confirm with 24h urine) or 24 hour urine (for total protein and creatinine clearance). · Treatment of chronic HTN is recommended to maintain blood pressure in normal to mild range (<160/<110). Start Procardia 30mg XL BID for BP>150/100. · Low dose Aspirin (162 mg daily qhs) is recommended to be started by 12-16 weeks for the prevention of preeclampsia in high risk women; some benefit seen with starting up to 28 weeks. · Will need serial growth ultrasounds in third trimester, as well as  testing to monitor maternal and fetal well-being  · Delivery timing for chronic hypertension not on meds is 38-39 weeks, all timing adjusted based on maternal and fetal condition. · Close monitoring of blood pressure for first 2 weeks postpartum, consider home blood pressure monitoring, medication to maintain <150/100. ([facility] preeclampsia monitoring program continues through 14 days pp.)  Recommend in office BP check day 5-7 after delivery. · Recommend 20mg PO lasix x 5 days beginning PPD #1    2020  At [facility]- NR NST at [facility] office, sent for BPP- .                        Current Facility-Administered Medications   Medication Dose Route Frequency    ibuprofen (MOTRIN) tablet 800 mg  800 mg Oral Q6H PRN    HYDROcodone-acetaminophen (NORCO) 5-325 mg per tablet 1-2 Tab  1-2 Tab Oral Q4H PRN    naloxone (NARCAN) injection 0.4 mg  0.4 mg IntraVENous PRN    simethicone (MYLICON) tablet 80 mg  80 mg Oral QID PRN    docusate sodium (COLACE) capsule 100 mg  100 mg Oral BID    diphenhydrAMINE (BENADRYL) capsule 25-50 mg  25-50 mg Oral Q4H PRN    diph,Pertuss(AC),Tet Vac-PF (BOOSTRIX) suspension 0.5 mL  0.5 mL IntraMUSCular PRIOR TO DISCHARGE    zolpidem (AMBIEN) tablet 10 mg  10 mg Oral QHS PRN    benzocaine-menthoL (DERMOPLAST) 20-0.5 % topical aerosol 1 Spray  1 Spray Topical PRN       OB History    Para Term  AB Living   1 1 1 0 0 1   SAB TAB Ectopic Molar Multiple Live Births   0 0 0 0 0 1      # Outcome Date GA Lbr Imer/2nd Weight Sex Delivery Anes PTL Lv   1 Term 20 37w6d 21:18 / 03:24 6 lb 1.2 oz (2.755 kg) F Vag-Vacuum EPI N HERLINDA      Name: Mckenna Reap: 8  Apgar5: 9       Assessment and Plan:      PPD 1:  Patient appears to be having uncomplicated post-partum course. Continue routine perineal care and maternal education. Plan discharge tomorrow if no problems occur. GDM, Metformin antepartum  AMA  CHTN w/ superimposted PIH:  BPs mild-moderately elevated, add lasix and Procardia        Breastfeeding strategies reviewed. SIDs  precautions reviewed    ABO Group   Date Value Ref Range Status   2020 B  Final     Rh (D)   Date Value Ref Range Status   2020 Positive  Final     Comment:     Please note: Prior records for this patient's ABO / Rh type are not  available for additional verification.        Rubella Ab, IgG   Date Value Ref Range Status   2020 1.82 Immune >0.99 index Final     Comment:                                     Non-immune       <0.90                                  Equivocal  0.90 - 0.99                                  Immune           >0.99

## 2020-12-13 VITALS
DIASTOLIC BLOOD PRESSURE: 81 MMHG | HEART RATE: 97 BPM | RESPIRATION RATE: 17 BRPM | SYSTOLIC BLOOD PRESSURE: 129 MMHG | TEMPERATURE: 97.3 F | OXYGEN SATURATION: 99 %

## 2020-12-13 PROCEDURE — 74011250636 HC RX REV CODE- 250/636: Performed by: OBSTETRICS & GYNECOLOGY

## 2020-12-13 PROCEDURE — 90715 TDAP VACCINE 7 YRS/> IM: CPT | Performed by: OBSTETRICS & GYNECOLOGY

## 2020-12-13 PROCEDURE — 2709999900 HC NON-CHARGEABLE SUPPLY

## 2020-12-13 PROCEDURE — 51798 US URINE CAPACITY MEASURE: CPT

## 2020-12-13 PROCEDURE — 74011250637 HC RX REV CODE- 250/637: Performed by: OBSTETRICS & GYNECOLOGY

## 2020-12-13 RX ORDER — HYDROCODONE BITARTRATE AND ACETAMINOPHEN 5; 325 MG/1; MG/1
1 TABLET ORAL
Qty: 20 TAB | Refills: 0 | Status: SHIPPED | OUTPATIENT
Start: 2020-12-13 | End: 2020-12-20

## 2020-12-13 RX ORDER — IBUPROFEN 800 MG/1
800 TABLET ORAL
Qty: 100 TAB | Refills: 2 | Status: SHIPPED | OUTPATIENT
Start: 2020-12-13 | End: 2021-01-27

## 2020-12-13 RX ADMIN — TETANUS TOXOID, REDUCED DIPHTHERIA TOXOID AND ACELLULAR PERTUSSIS VACCINE, ADSORBED 0.5 ML: 5; 2.5; 8; 8; 2.5 SUSPENSION INTRAMUSCULAR at 12:10

## 2020-12-13 RX ADMIN — IBUPROFEN 800 MG: 800 TABLET ORAL at 10:30

## 2020-12-13 RX ADMIN — IBUPROFEN 800 MG: 800 TABLET ORAL at 02:17

## 2020-12-13 RX ADMIN — DOCUSATE SODIUM 100 MG: 100 CAPSULE, LIQUID FILLED ORAL at 10:30

## 2020-12-13 NOTE — DISCHARGE SUMMARY
Obstetrical Discharge Summary     Name: Mi Lane MRN: [id number]  SSN: [id number]    YOB: 1980  Age: 38 y.o. Sex: female      Allergies: Patient has no known allergies. Admit Date: 12/10/2020    Discharge Date: 2020     Admitting Physician: Lucretia Cortes MD     Attending Physician:  Ann Rocha MD     * Admission Diagnoses: 37 weeks gestation of pregnancy [Z3A.37];GDM (gestational diabetes mellitus) [O24.419]; Gestational hypertension [O13.9]; Encounter for induction of labor [Z34.90]    * Discharge Diagnoses:   Information for the patient's :  Trevor Montes [[id number]]   Delivery of a 6 lb 1.2 oz (2.755 kg) female infant via Vaginal, Vacuum (Extractor) on 2020 at 10:19 PM  by Mahin Benoit. Apgars were 8  and 9 . Additional Diagnoses:   Hospital Problems as of 2020 Date Reviewed: 12/10/2020          Codes Class Noted - Resolved POA    * (Principal) Gestational hypertension ICD-10-CM: O13.9  ICD-9-CM: 642.30  12/10/2020 - Present Yes        Small for gestational age fetus affecting management of mother in malik pregnancy in third trimester ICD-10-CM: O36.5930  ICD-9-CM: 656.53  2020 - Present Yes    Overview Signed 2020  2:40 PM by Ann Rocha MD     2020:  EFW 28%, AC 12%, SAMAN 20.4 cm, vtx             Gestational diabetes mellitus (GDM) in third trimester controlled on oral hypoglycemic drug ICD-10-CM: O24.415  ICD-9-CM: 648.80  10/1/2020 - Present Yes    Overview Addendum 2020  9:36 AM by Arturo Quintero NP     Failed 1 hr glucola, failed 3hr GTT   10/28/2020:  approx 50% log elevated - starting Metformin    2020: BS log reviewed, almost all fastings elevated. Increase Metformin to 500 mg in am and 1000 mg in pm    2020: Fasting improved 92-99, all 1hr PP within range.  Continue Metformin 500 mg in am and 1000mg in pm    2020: Fastings and 1hr PP all within target, will decrease Metformin to 500mg PO BID due to AC%             Obesity affecting pregnancy in third trimester ICD-10-CM: O99.213  ICD-9-CM: 649.13  2020 - Present Yes    Overview Addendum 2020  1:23 PM by Shamar Bledsoe MD     20 Hgb A1C 5.5  2020 at [facility]: getting GTT 1 hr Wed at Primary OB               Anemia affecting pregnancy in third trimester ICD-10-CM: O99.013  ICD-9-CM: 648.23, 285.9  2020 - Present Yes    Overview Addendum 12/3/2020  2:23 PM by Isai Smith NP     Add'l Fe/Colace  2020 at [facility]:    · Recheck hgb 4 weeks  · Refer to hematology if hgb <9.5    2020: Hgb 10.7  2020: Hgb 10.6             Primigravida of advanced maternal age in third trimester ICD-10-CM: O09.513  ICD-9-CM: 659.53  7/15/2020 - Present Yes    Overview Addendum 2020  1:26 PM by Shamar Bledsoe MD     [address] [address] [facility]  [facility] US not C/W LMP+  PLAN:  NIPT (negx3,female), baby ASA 12-36 weeks, MFM referral,  testing around 32 weeks with delivery around 39 weeks    8/10/2020 at [facility]:  Normal anatomy and echo; AC 20%, CL 4.0cm. LOW RISK NIPT    2020 at [facility]: EFW 57%, AC 42%, SAMAN nl, dopplers nl, BREECH, no explanation for VB  2020 at [facility]: Normal repeat echo;  AC 37%, Overall 47%, SAMAN 19.5  · No F/U MFM; return for concerns    10/14/2020: Plans breastfeeding, TDAP/Flu vaccine recommended, LARCs reviewed  2020:  EFW 47%, AC 34%, SAMAN 16.6 cm, vtx             Hypertension affecting pregnancy in third trimester ICD-10-CM: O16.3  ICD-9-CM: 642.93  7/15/2020 - Present Yes    Overview Addendum 12/10/2020  1:36 PM by Shamar Bledsoe MD     CHTN vs Gestational HTN    7/15/2020:  Single elevation, usually wnl per pt, anxious at visit today  -Close obs  8/10/2020 at [facility]: BP elevated then normal when rechecked. May have underlying CHTN, denies history.   2020: 24hr urine protein 110mg, all labs wnl  2020: /90 x2, will complete preeclampsia labs today, RTO Friday for BP check and to dropoff 24 hr urineDenies HA, vision changes, RUQ/epigastric pain, N/V or swelling. Preeclampsia labs nl, urine P/C ratio 196  2020: 24 urine protein 246mg  2020 at [facility]: 142/82 with reg cuff, 124/78 with large cuff. Denies symptoms  Repeat preeclamptic labs for elevated BP or symptoms in your office :  (CBC, CMP, LDH, Uric Acid); also do P/C ratio (if elevated, need to then confirm with 24h urine) or 24 hour urine (for total protein and creatinine clearance). · Treatment of chronic HTN is recommended to maintain blood pressure in normal to mild range (<160/<110). Start Procardia 30mg XL BID for BP>150/100. · Low dose Aspirin (162 mg daily qhs) is recommended to be started by 12-16 weeks for the prevention of preeclampsia in high risk women; some benefit seen with starting up to 28 weeks. · Will need serial growth ultrasounds in third trimester, as well as  testing to monitor maternal and fetal well-being  · Delivery timing for chronic hypertension not on meds is 38-39 weeks, all timing adjusted based on maternal and fetal condition. · Close monitoring of blood pressure for first 2 weeks postpartum, consider home blood pressure monitoring, medication to maintain <150/100. ([facility] preeclampsia monitoring program continues through 14 days pp.)  Recommend in office BP check day 5-7 after delivery. · Recommend 20mg PO lasix x 5 days beginning PPD #1    2020  At [facility]- NR NST at [facility] office, sent for BPP- . Lab Results   Component Value Date/Time    ABO/Rh(D) B POSITIVE 12/10/2020 09:02 PM    There is no immunization history for the selected administration types on file for this patient. * Procedures: vacuum delivery with second degree laceration and repair    * Discharge Condition: good    [facility] Course: bp elevated for 24 hours periodically. Normalized without medication second 24 hours. Difficulty voiding 1st day .  Second day no problems. * Disposition: Home    Discharge Medications:   Current Discharge Medication List      START taking these medications    Details   ibuprofen (MOTRIN) 800 mg tablet Take 1 Tab by mouth every six (6) hours as needed for Pain. Qty: 100 Tab, Refills: 2      HYDROcodone-acetaminophen (NORCO) 5-325 mg per tablet Take 1 Tab by mouth every four (4) hours as needed for Pain for up to 7 days. Max Daily Amount: 6 Tabs. Qty: 20 Tab, Refills: 0    Associated Diagnoses: Postoperative pain         CONTINUE these medications which have NOT CHANGED    Details   ferrous sulfate 325 mg (65 mg iron) tablet Take 1 Tab by mouth two (2) times a day. Qty: 60 Tab, Refills: 6      prenatal vit calc,iron,folic (PRENATAL VITAMIN PO) Take  by mouth. STOP taking these medications       metFORMIN (GLUCOPHAGE) 500 mg tablet Comments:   Reason for Stopping:         OneTouch Ultra2 Meter misc Comments:   Reason for Stopping:         OneTouch Delica Plus Lancet 30 gauge misc Comments:   Reason for Stopping:         glucose blood VI test strips (blood glucose test) strip Comments:   Reason for Stopping:               * Follow-up Care/Patient Instructions:   Activity: Activity as tolerated  Diet: Regular Diet  Wound Care: As directed    Follow-up Information     Follow up With Specialties Details Why Contact Info    None    None ([phone]) Patient stated that they have no PCP           Jyotsna Burroughs MD  12:03 PM

## 2020-12-13 NOTE — DISCHARGE INSTRUCTIONS
Discharge instruction to follow: Activity: Pelvis rest for 6 weeks     No heavy lifting over 15 lbs for 2 weeks     No driving for 2 weeks     No push/pull motion such as sweeping or vacuuming for 2 weeks     No tub baths for 6 weeks    If using linda-bottle continue to use until comfortable stopping. Change sanitary pad after each urination or bowel movement. Call MD for the following:      Fever over 101 F; pain not relieved by medication; foul smelling vaginal discharge or an increase in vaginal bleeding. Take medication as prescribed. Follow up with MD as ordered. Patient Education        Vaginal Childbirth: Care Instructions  Overview     Vaginal birth means delivering a baby through the birth canal (vagina). During labor, the uterus tightens (contracts) regularly to thin and open the cervix and to push the baby out through the birth canal.  Your body will slowly heal in the next few weeks. It's easy to get too tired and overwhelmed during the first weeks after your baby is born. Changes in your hormones can shift your mood without warning. You may find it hard to meet the extra demands on your energy and time. Take it easy on yourself. Follow-up care is a key part of your treatment and safety. Be sure to make and go to all appointments, and call your doctor if you are having problems. It's also a good idea to know your test results and keep a list of the medicines you take. How can you care for yourself at home? Vaginal bleeding and cramps  · After delivery, you will have a bloody discharge from your vagina. This will turn pink within a week and then white or yellow after about 10 days. It may last for 2 to 4 weeks or longer, until the uterus has healed. Use pads instead of tampons until you stop bleeding. · Don't worry if you pass some blood clots, as long as they are smaller than a golf ball. If you have a tear or stitches in your vaginal area, change the pad at least every 4 hours.  This will help prevent soreness and infection. · You may have cramps for the first few days after childbirth. These are normal and occur as the uterus shrinks to normal size. Take an over-the-counter pain medicine, such as acetaminophen (Tylenol), ibuprofen (Advil, Motrin), or naproxen (Aleve), for cramps. Read and follow all instructions on the label. Do not take aspirin, because it can cause more bleeding. · Do not take two or more pain medicines at the same time unless the doctor told you to. Many pain medicines have acetaminophen, which is Tylenol. Too much acetaminophen (Tylenol) can be harmful. Stitches  · If you have stitches, they will dissolve on their own and don't need to be removed. Follow your doctor's instructions for cleaning the stitched area. · Put ice or a cold pack on your painful area for 10 to 20 minutes at a time, several times a day, for the first few days. Put a thin cloth between the ice and your skin. · Sit in a few inches of warm water (sitz bath) 3 times a day and after bowel movements. The warm water helps with pain and itching. If you don't have a tub, a warm shower might help. Breast fullness  · Your breasts may overfill (engorge) in the first few days after delivery. To help milk flow and to relieve pain, warm your breasts in the shower or by using warm, moist towels before nursing. · If you aren't nursing, don't put warmth on your breasts or touch your breasts. Wear a tight bra or sports bra and use ice until the fullness goes away. This usually takes 2 to 3 days. · Put ice or a cold pack on your breast after nursing to reduce swelling and pain. Put a thin cloth between the ice and your skin. Activity  · Eat a balanced diet. Don't try to lose weight by cutting calories. Keep taking your prenatal vitamins, or take a multivitamin. · Get as much rest as you can. Try to take naps when your baby sleeps during the day. · Get some exercise every day.  But don't do any heavy exercise until your doctor says it is okay. · Wait until you are healed (about 4 to 6 weeks) before you have sexual intercourse. Your doctor will tell you when it is okay to have sex. · Talk to your doctor about birth control. You can get pregnant even before your period returns. Also, you can get pregnant while you are breastfeeding. Mental health  · It's normal to have some sadness, anxiety, sleeplessness, and mood swings after you go home. If you feel upset or hopeless for more than a few days or are having trouble doing the things you need to do, talk to your doctor. Constipation and hemorrhoids  · Drink plenty of fluids, enough so that your urine is light yellow or clear like water. If you have kidney, heart, or liver disease and have to limit fluids, talk with your doctor before you increase the amount of fluids you drink. · Eat plenty of fiber each day. Have a bran muffin or bran cereal for breakfast. Try eating a piece of fruit for a mid-afternoon snack. · For painful, itchy hemorrhoids, put ice or a cold pack on the area several times a day for 10 minutes at a time. Follow this by putting a warm compress on the area for another 10 to 20 minutes or by sitting in a shallow, warm bath. When should you call for help? Call  911 anytime you think you may need emergency care. For example, call if:    · You have thoughts of harming yourself, your baby, or another person.     · You passed out (lost consciousness).     · You have chest pain, are short of breath, or cough up blood.     · You have a seizure. Call your doctor now or seek immediate medical care if:    · You have severe vaginal bleeding.     · You are dizzy or lightheaded, or you feel like you may faint.     · You have a fever.     · You have new or more pain in your belly or pelvis.     · You have symptoms of a blood clot in your leg (called a deep vein thrombosis), such as:  ? Pain in the calf, back of the knee, thigh, or groin. ?  Redness and swelling in your leg or groin.     · You have signs of preeclampsia, such as:  ? Sudden swelling of your face, hands, or feet. ? New vision problems (such as dimness, blurring, or seeing spots). ? A severe headache. Watch closely for changes in your health, and be sure to contact your doctor if:    · Your vaginal bleeding seems to be getting heavier.     · You have new or worse vaginal discharge.     · You feel sad, anxious, or hopeless for more than a few days.     · You do not get better as expected. Where can you learn more? Go to http://www.gray.com/  Enter Q237 in the search box to learn more about \"Vaginal Childbirth: Care Instructions. \"  Current as of: February 11, 2020               Content Version: 12.6  © 8232-9531 [employer]. Care instructions adapted under license by [employer] (which disclaims liability or warranty for this information). If you have questions about a medical condition or this instruction, always ask your healthcare professional. [place] [address] any warranty or liability for your use of this information.

## 2020-12-13 NOTE — PROGRESS NOTES
Teaching for self care reviewed. Discharge instructions reviewed and E-signed. Copy given to pt. Prescriptions reviewed. Reviewed follow up appointment for self. Questions encouraged and answered. Identification verified with mom and infant bands and signed. Instructed to call when ready for discharge.

## 2020-12-13 NOTE — LACTATION NOTE

## 2020-12-13 NOTE — LACTATION NOTE
Individualized Feeding Plan for Breastfeeding   Lactation Services [phone]      As much as possible, hold your baby on your chest so babys bare skin is against your bare skin with a blanket covering babys back, especially 30 minutes before it is time for baby to eat. Watch for early feeding cues such as, licking lips, sucking motions, rooting, hands to mouth. Crying is a late feeding cue. Feed your baby at least 8 times in 24 hours, or more if your baby is showing feeding cues. If baby is sleepy put baby skin to skin and watch for hunger cues. To rouse baby: unwrap, undress, massage hands, feet, & back, change diaper, gently change babys position from lying to sitting. 15-20 minutes on the first breast of active breastfeeding is considered a good feeding. Good, active breastfeeding is when baby is alert, tugging the nipple, their ear may move, and you can hear swallows. Allow baby to finish the first side before changing sides. Sleeping at the breast or only brief, light sucks should not be considered a good, full breastfeed. At each feeding:  __x__1. Do Suck Practice on finger before each feeding until sucking pattern is smooth. Try using index finger. Nail down towards tongue. __x__2. Hand Express for a few minutes prior to latching to help start milk flow. __x__3. Baby needs to NURSE WELL x 15-20 minutes on at least first breast, burp and offer 2nd breast at every feeding. If no sustained latch only attempt at breast for 10 minutes. If baby does not latch on and feed well on at least one side, you should:   __x__4. Double pump for 15 minutes with breast massage and compression. Hand express for an additional 2-3 minutes per side. Pump after each feeding attempt or poor feeding, up to 8 times per day. If you are not putting baby to the breast you need to pump 8 times a day. Pump every 3 hours. __x__5.  Give baby all of the breast milk you obtain using a straight syringe or  curved syringe. If baby does NOT have enough wet and dirty diapers per day, is jaundiced/lethargic, or has significant weight loss AND you do NOT pump enough milk for each feeding (per volume listed below), formula supplementation may need to be used. Call lactation department /pediatrician if you have concerns. AVERAGE INTAKES OF COLOSTRUM BY HEALTHY  INFANTS:  Time  Day Intake (ml per feeding)  Based on 8 feedings per day. 1st 24 hrs  1 2-10 ml  24-48 hrs  2 5-15 ml    Based on every 3 hour feedings  48-72 hrs  3 15-30 ml (0.5-1 oz)  72-96 hrs  4 30-45 ml (1-1.5oz)                          5-6       45-60 ml (1.5-2oz)                           7          60-75 ml (2-2.5oz)    By day 7, baby will need 61 ml or 2 oz at each feeding based on 8 feedings per day & babys weight. (1oz = 30ml). Total milk volume needed in 24 hours by Day 7 is 16.2 oz per day based on baby's birthweight of 6 lbs 1 oz. The more often baby eats, the less volume they need per feeding. If baby is eating more often than the minimum of 8 times per day, they may take less per feeding. If pumping, suggest using olive oil or coconut oil on your nipples before pumping to help reduce the friction. Use feeding plan until follow up with pediatrician. Continue to attempt at the breast for most feeds. Pump every 3 hours if no latch. Give all pumped colostrum/breastmilk at each feeding. Mom and infant are following up with [facility] and will see lactation consultant there. Outpatient services are located on the 4th floor at [address] [place] [address]. Check in at the 4th floor registration desk (the same one you used when you came to have your baby).   Call for questions [phone]

## 2020-12-13 NOTE — PROGRESS NOTES
Care Management Interventions  PCP Verified by CM: Yes(No PCP. Pt requested referral to [facility])  Discharge Location  Discharge Placement: Home  Spoke with patient and provided informational packet on  mood & anxiety disorders (resources/education). Also provided education on [facility] Postpartum [place] [facility] Visit. At this time [facility] is completing visit telephonically due to COVID precautions. Patient would like to participate in program.  Will make referral.   Referral made to [facility] for help establishing PCP.

## 2020-12-13 NOTE — PROGRESS NOTES
Pt states that she is emptying her bladder better. Output shows that she is voiding more volume each time she goes to the bathroom.

## 2020-12-13 NOTE — PROGRESS NOTES
Progress Note                               Patient: Franki Ma MRN: [id number]  SSN: [id number]    YOB: 1980  Age: 38 y.o. Sex: female      Postpartum Day Number 2    Subjective:     Patient doing well postpartum without significant complaints. Voiding without difficulty. Patient reports normal lochia. . Breastfeeding: trying    Objective:     Patient Vitals for the past 18 hrs:   Temp Pulse Resp BP SpO2   20 1119 97.3 °F (36.3 °C) 97 17 129/81 99 %   20 0731 97.4 °F (36.3 °C) 81 17 111/61 100 %   20 0550 97.6 °F (36.4 °C) 91 18 114/72 97 %   20 2344 99.3 °F (37.4 °C) (!) 116 19 139/75 98 %   20 2018 98.2 °F (36.8 °C) (!) 115 20 132/78 98 %        Temp (24hrs), Av.1 °F (36.7 °C), Min:97.3 °F (36.3 °C), Max:99.3 °F (37.4 °C)      Physical Exam:    General:   Patient without distress. Abdomen: Soft, fundus firm slightly above umbilicus, nontender   Lower Extremities: Negative for swelling, cords, or tenderness. Lab/Data Review:  CBC:    Recent Labs     12/10/20  2102 20  0948 20  0950 20  1636 20  1040 20  0952   WBC 6.6 5.5 7.5 8.7 8.1 8.2   HGB 11.4* 10.6* 10.7* 10.4* 10.9* 11.4   HCT 37.3 35.0 33.5* 33.3* 34.7 35.7    163 166 185 172 176       CMP:   Recent Labs     10/14/20  1119 20  1636 20  1040   NA  --  135 134   K  --  3.9 4.4   CL  --  104 104   CO2  --  20 18*   * 102* 104*   BUN  --  9 11   CREA  --  0.65 0.68   GFRAA  --  128 127   GFRNA  --  111 110   CA  --  8.9 9.0   ALB  --  3.8 3.8   TP  --  6.7 6.8   AGRAT  --  1.3 1.3   ALT  --  22 30       Recent Labs     20  1636 20  1040   URICA 3.5 4.0    154       COAGS:  No results for input(s): APTT, PTP, INR, INREXT in the last 7224 hours.     Invalid input(s): PTTP, INRT    Recent Glucose Results: Recent Glucose Results:   Recent Labs     20  1539 20  1247 20  0730 12/10/20  2138 10/14/20  1119 09/23/20  1636 08/12/20  1040   GLU  --   --   --   --  101* 102* 104*   GLUCPOC 82 79 79 99  --   --   --        Prenatal Labs:  No results found for: RUBELLAEXT, GRBSEXT, HBSAGEXT, HIVEXT, RPREXT, TPALEXT, GONNOEXT, CHLAMEXT, [id number], [id number]      Assessment and Plan:     She is doing well. Her bp is good last 24 hours without meds and she has no edema or shortness of breath  Will discharge without medication but follow up this week with dr. Juan Francisco Flowers.      Amadeo Mack MD  11:55 AM

## 2020-12-13 NOTE — LACTATION NOTE
In to see mom and infant prior to discharge to home. Mom stated that she continues to offer infant the breast, pump and then offer infant formula supplement. Reviewed discharge information as well as a feeding plan. Mom and infant are following up with [facility] and will see lactation consultant there.

## 2020-12-14 NOTE — ADDENDUM NOTE
Addendum  created 12/14/20 0809 by Isabel Suggs CRNA    Flowsheet accepted, Intraprocedure Flowsheets edited

## 2021-01-27 PROBLEM — O13.9 GESTATIONAL HYPERTENSION: Status: RESOLVED | Noted: 2020-12-10 | Resolved: 2021-01-27

## 2021-01-27 PROBLEM — O98.812 CHLAMYDIA INFECTION COMPLICATING PREGNANCY, SECOND TRIMESTER: Status: RESOLVED | Noted: 2020-08-11 | Resolved: 2021-01-27

## 2021-01-27 PROBLEM — A74.9 CHLAMYDIA INFECTION COMPLICATING PREGNANCY, SECOND TRIMESTER: Status: RESOLVED | Noted: 2020-08-11 | Resolved: 2021-01-27

## 2021-01-27 PROBLEM — O24.415 GESTATIONAL DIABETES MELLITUS (GDM) IN THIRD TRIMESTER CONTROLLED ON ORAL HYPOGLYCEMIC DRUG: Status: RESOLVED | Noted: 2020-10-01 | Resolved: 2021-01-27

## 2021-01-27 PROBLEM — O16.3 HYPERTENSION AFFECTING PREGNANCY IN THIRD TRIMESTER: Status: RESOLVED | Noted: 2020-07-15 | Resolved: 2021-01-27

## 2021-01-27 PROBLEM — O36.5930 SMALL FOR GESTATIONAL AGE FETUS AFFECTING MANAGEMENT OF MOTHER IN SINGLETON PREGNANCY IN THIRD TRIMESTER: Status: RESOLVED | Noted: 2020-11-24 | Resolved: 2021-01-27

## 2021-01-27 PROBLEM — O99.013 ANEMIA AFFECTING PREGNANCY IN THIRD TRIMESTER: Status: RESOLVED | Noted: 2020-08-13 | Resolved: 2021-01-27

## 2021-01-27 PROBLEM — O09.513 PRIMIGRAVIDA OF ADVANCED MATERNAL AGE IN THIRD TRIMESTER: Status: RESOLVED | Noted: 2020-07-15 | Resolved: 2021-01-27

## 2021-01-27 PROBLEM — O99.213 OBESITY AFFECTING PREGNANCY IN THIRD TRIMESTER: Status: RESOLVED | Noted: 2020-09-28 | Resolved: 2021-01-27
